# Patient Record
Sex: MALE | Race: WHITE | NOT HISPANIC OR LATINO | URBAN - METROPOLITAN AREA
[De-identification: names, ages, dates, MRNs, and addresses within clinical notes are randomized per-mention and may not be internally consistent; named-entity substitution may affect disease eponyms.]

---

## 2020-08-30 ENCOUNTER — EMERGENCY (EMERGENCY)
Facility: HOSPITAL | Age: 25
LOS: 1 days | Discharge: HOME | End: 2020-08-30
Attending: EMERGENCY MEDICINE
Payer: COMMERCIAL

## 2020-08-30 VITALS
TEMPERATURE: 98 F | RESPIRATION RATE: 19 BRPM | HEART RATE: 100 BPM | DIASTOLIC BLOOD PRESSURE: 84 MMHG | HEIGHT: 70 IN | SYSTOLIC BLOOD PRESSURE: 132 MMHG | OXYGEN SATURATION: 96 % | WEIGHT: 220.02 LBS

## 2020-08-30 PROCEDURE — 12042 INTMD RPR N-HF/GENIT2.6-7.5: CPT

## 2020-08-30 PROCEDURE — 99284 EMERGENCY DEPT VISIT MOD MDM: CPT | Mod: 25

## 2020-08-30 PROCEDURE — 73120 X-RAY EXAM OF HAND: CPT | Mod: 26,50

## 2020-08-30 PROCEDURE — 73120 X-RAY EXAM OF HAND: CPT

## 2020-08-30 NOTE — ED PROVIDER NOTE - OBJECTIVE STATEMENT
25yo male pt, no PMHx, ambulatory c/o b/l hand lacerations by broken glass this evening. Denies other injuries. Denies sensory changes or weakness to extremities. Denies fever, chills, cough or recent sickness. TD- UTDed.

## 2020-08-30 NOTE — ED ADULT NURSE NOTE - OBJECTIVE STATEMENT
24yM presents to the ED for multiple lacerations on BL hands. Pt states he was holding a wine glass when he broke it in his hands. Pt complaining of mild pain of BL hands rated 2/10. Tetanus shot up to date per pt.

## 2020-08-30 NOTE — ED PROVIDER NOTE - ATTENDING CONTRIBUTION TO CARE
Attending MD Guerrier:  I personally have seen and examined this patient.  NP note reviewed and agree on plan of care and except where noted.  See HPI, PE, and MDM for details.

## 2020-08-30 NOTE — ED PROVIDER NOTE - PHYSICAL EXAMINATION
NAD, Tachycardia, Afebrile, Lungs clear. + Multiple superficial abrasions and lacerations on palmar aspect; one superficial 2cm laceration on right palm with oozing blood. N/V- intact with full ROMs.

## 2020-08-30 NOTE — ED PROVIDER NOTE - NSFOLLOWUPINSTRUCTIONS_ED_ALL_ED_FT
Elevate the affected hand.  Keep wound clean and dry washing with soap and water 24 hours later.   Bacitracin ointment to wound twice a day.  Tylenol for pain as needed.  Return in 7-10days for suture removal.  Follow up with your primary Dr. for reevaluation.  Return for any concerns or worsening symptoms.

## 2020-08-30 NOTE — ED PROVIDER NOTE - PATIENT PORTAL LINK FT
You can access the FollowMyHealth Patient Portal offered by [facility] by registering at the following website: [email or web address]. By joining [facility]’s FollowMyHealth portal, you will also be able to view your health information using other applications (apps) compatible with our system.

## 2020-08-30 NOTE — ED PROVIDER NOTE - CLINICAL SUMMARY MEDICAL DECISION MAKING FREE TEXT BOX
Attending MD Guerrier: Pt presenting with superficial lacerations to b/l hands that occurred on glass wine bottle. Plan for XR to r/o retained glass, appears likely only the right palmar laceration may require suture, patient reports up to date tetanus

## 2020-08-31 VITALS
SYSTOLIC BLOOD PRESSURE: 136 MMHG | OXYGEN SATURATION: 98 % | TEMPERATURE: 98 F | DIASTOLIC BLOOD PRESSURE: 98 MMHG | RESPIRATION RATE: 20 BRPM | HEART RATE: 98 BPM

## 2020-08-31 NOTE — ED PROCEDURE NOTE - PROCEDURE ADDITIONAL DETAILS
Dermabond to superficial laceration of right palm/3rd finger and left 5th finger applied.  4 stitches on right palm superficial laceration wound; Bacitracin/ Telpha and bandage applied over the stitches.
